# Patient Record
Sex: FEMALE | Race: WHITE | NOT HISPANIC OR LATINO | Employment: FULL TIME | URBAN - METROPOLITAN AREA
[De-identification: names, ages, dates, MRNs, and addresses within clinical notes are randomized per-mention and may not be internally consistent; named-entity substitution may affect disease eponyms.]

---

## 2018-08-01 ENCOUNTER — TELEPHONE (OUTPATIENT)
Dept: OBSTETRICS AND GYNECOLOGY | Facility: CLINIC | Age: 23
End: 2018-08-01

## 2018-08-01 NOTE — TELEPHONE ENCOUNTER
----- Message from Tamara Kendrick sent at 8/1/2018  8:58 AM CDT -----  Contact: Taisha   Type:  Sooner Apoointment Request    Caller is requesting a sooner appointment.  Caller declined first available appointment listed below.  Caller will not accept being placed on the waitlist and is requesting a message be sent to doctor.    Name of Caller:  patient  When is the first available appointment?  10/9/18  Symptoms:  28 weeks pregnant (just moved to area)  Best Call Back Number:  [phone]  Additional Information:  na

## 2018-08-01 NOTE — TELEPHONE ENCOUNTER
Patient declined appointments with  and  because she has seen them in the past and doesn't like them. I gave her an appointment with .

## 2018-08-06 ENCOUNTER — INITIAL PRENATAL (OUTPATIENT)
Dept: OBSTETRICS AND GYNECOLOGY | Facility: CLINIC | Age: 23
End: 2018-08-06
Payer: MEDICAID

## 2018-08-06 VITALS
WEIGHT: 108.25 LBS | SYSTOLIC BLOOD PRESSURE: 112 MMHG | DIASTOLIC BLOOD PRESSURE: 70 MMHG | BODY MASS INDEX: 21.14 KG/M2

## 2018-08-06 DIAGNOSIS — O43.103 PLACENTAL ABNORMALITY IN THIRD TRIMESTER: ICD-10-CM

## 2018-08-06 DIAGNOSIS — F32.A DEPRESSION DURING PREGNANCY IN THIRD TRIMESTER: ICD-10-CM

## 2018-08-06 DIAGNOSIS — O35.EXX0 FETAL HYDRONEPHROSIS DURING PREGNANCY, ANTEPARTUM, SINGLE OR UNSPECIFIED FETUS: ICD-10-CM

## 2018-08-06 DIAGNOSIS — Z3A.28 28 WEEKS GESTATION OF PREGNANCY: ICD-10-CM

## 2018-08-06 DIAGNOSIS — Z34.93 NORMAL PREGNANCY IN THIRD TRIMESTER: Primary | ICD-10-CM

## 2018-08-06 DIAGNOSIS — O26.899 RH NEGATIVE STATE IN ANTEPARTUM PERIOD: ICD-10-CM

## 2018-08-06 DIAGNOSIS — Z67.91 RH NEGATIVE STATE IN ANTEPARTUM PERIOD: ICD-10-CM

## 2018-08-06 DIAGNOSIS — O99.343 DEPRESSION DURING PREGNANCY IN THIRD TRIMESTER: ICD-10-CM

## 2018-08-06 DIAGNOSIS — O09.30 LATE PRENATAL CARE: ICD-10-CM

## 2018-08-06 LAB
BILIRUB SERPL-MCNC: NORMAL MG/DL
BLOOD URINE, POC: NORMAL
COLOR, POC UA: NORMAL
GLUCOSE UR QL STRIP: 1
KETONES UR QL STRIP: NORMAL
LEUKOCYTE ESTERASE URINE, POC: 1
NITRITE, POC UA: NORMAL
PH, POC UA: 6
PROTEIN, POC: NORMAL
SPECIFIC GRAVITY, POC UA: NORMAL
UROBILINOGEN, POC UA: NORMAL

## 2018-08-06 PROCEDURE — 99203 OFFICE O/P NEW LOW 30 MIN: CPT | Mod: TH,S$PBB,, | Performed by: OBSTETRICS & GYNECOLOGY

## 2018-08-06 PROCEDURE — 99213 OFFICE O/P EST LOW 20 MIN: CPT | Mod: PBBFAC,TH,PN | Performed by: OBSTETRICS & GYNECOLOGY

## 2018-08-06 PROCEDURE — 81002 URINALYSIS NONAUTO W/O SCOPE: CPT | Mod: PBBFAC,PN | Performed by: OBSTETRICS & GYNECOLOGY

## 2018-08-06 PROCEDURE — 99999 PR PBB SHADOW E&M-EST. PATIENT-LVL III: CPT | Mod: PBBFAC,,, | Performed by: OBSTETRICS & GYNECOLOGY

## 2018-08-06 RX ORDER — AZITHROMYCIN 500 MG/1
TABLET, FILM COATED ORAL
COMMUNITY
End: 2018-08-06 | Stop reason: ALTCHOICE

## 2018-08-06 RX ORDER — FOLIC ACID 1 MG/1
1 TABLET ORAL DAILY
Qty: 100 TABLET | Refills: 3 | Status: SHIPPED | OUTPATIENT
Start: 2018-08-06 | End: 2019-08-06

## 2018-08-06 RX ORDER — SERTRALINE HYDROCHLORIDE 25 MG/1
25 TABLET, FILM COATED ORAL DAILY
COMMUNITY

## 2018-08-06 NOTE — PROGRESS NOTES
Taisha Llamas is a 22 y.o. female with Estimated Date of Delivery: 10/25/18   Obstetric History       T1      L1     SAB1   TAB0   Ectopic0   Multiple0   Live Births1         AT 28w4d  Here today on 2018 for   Chief Complaint   Patient presents with    Routine Prenatal Visit     transfer of care 28 weeks, she states she has has rhogam two time in this pregnancy at 11 weeks and again at 15, corioplexis cysts and enlarged kidneys seen on last US    Medication Refill     needs a new prenatal       Current Outpatient Prescriptions   Medication Sig Dispense Refill    rho,D, immune globulin (RHOGAM ULTRA-FILTERED PLUS) 1,500 unit (300 mcg) Syrg RhoGAM Ultra-Filtered PLUS 1,500 unit (300 mcg) intramuscular syringe   Inject 1 microgram by intramuscular route.      sertraline (ZOLOFT) 25 MG tablet Take 25 mg by mouth once daily.      docosahexanoic acid (PRENATAL DHA) 200 mg Cap Prenatal + DHA       No current facility-administered medications for this visit.      Review of patient's allergies indicates:   Allergen Reactions    Augmentin [amoxicillin-pot clavulanate] Nausea And Vomiting    Strawberries [strawberry] Itching    Adhesive Rash       Past Medical History:   Diagnosis Date    Abdominal pain, recurrent 2013    ADHD (attention deficit hyperactivity disorder)     Scoliosis        Past Surgical History:   Procedure Laterality Date    TYMPANOSTOMY TUBE PLACEMENT      WISDOM TOOTH EXTRACTION         OB History    Para Term  AB Living   3 1 1   1 1   SAB TAB Ectopic Multiple Live Births   1       1      # Outcome Date GA Lbr Tomas/2nd Weight Sex Delivery Anes PTL Lv   3 Current            2 Term 16 37w2d  2.948 kg (6 lb 8 oz) M Vag-Spont  N TIM   1 SAB                   Social History     Social History    Marital status: Single     Spouse name: N/A    Number of children: N/A    Years of education: N/A     Social History Main Topics    Smoking status:  Passive Smoke Exposure - Never Smoker    Smokeless tobacco: Never Used      Comment: mom smokes outside    Alcohol use Yes      Comment: socially     Drug use: No    Sexual activity: Yes     Partners: Male     Birth control/ protection: Condom     Other Topics Concern    None     Social History Narrative    Lives with Mom, brother.    2 dogs inside. Outside cat.    11th grade. Likes school.               Vitals:    08/06/18 1043   BP: 112/70   Pulse: (!) 135       Review of Symptoms:  GENERAL: Denies fatigue, and malaise.   SKIN: Denies rash or lesions.   HEAD: Denies head injury or headache.   NODES: Denies enlarged lymph nodes.   CHEST: Denies chest pain or shortness of breath.   CARDIOVASCULAR: Denies palpitations or left sided chest pain.   ABDOMEN: No abdominal pain, constipation, diarrhea, nausea, vomiting or rectal bleeding.   URINARY: No frequency, urgency, dysuria, or hematuria  MUSCULOSKELETAL: Denies joint pain or swelling.   NEUROLOGIC: Denies seizures.    B NEG    ASSESSMENT    Encounter Diagnoses   Name Primary?    Normal pregnancy in third trimester Yes    28 weeks gestation of pregnancy     Rh negative state in antepartum period     Late prenatal care     Placental abnormality in third trimester     Fetal hydronephrosis during pregnancy, antepartum, single or unspecified fetus     Choroid plexus cyst of fetus, single or unspecified fetus        PLAN  1.  RTC 2 weeks     I have reviewed the blood pressure, urine results, fetal heart rate check, fundal height and agree.

## 2018-08-10 ENCOUNTER — TELEPHONE (OUTPATIENT)
Dept: OBSTETRICS AND GYNECOLOGY | Facility: CLINIC | Age: 23
End: 2018-08-10

## 2018-08-10 NOTE — TELEPHONE ENCOUNTER
----- Message from Kimmy Victoria sent at 8/10/2018 11:14 AM CDT -----  Contact: self  Call placed to POD    Patient need to speak with nurse regarding what kind of medication can she take for allergies patient states she is pregnant (29 weeks)    Please call to advice [phone] (home)